# Patient Record
Sex: MALE | Race: WHITE | NOT HISPANIC OR LATINO | URBAN - METROPOLITAN AREA
[De-identification: names, ages, dates, MRNs, and addresses within clinical notes are randomized per-mention and may not be internally consistent; named-entity substitution may affect disease eponyms.]

---

## 2017-01-01 ENCOUNTER — INPATIENT (INPATIENT)
Facility: HOSPITAL | Age: 82
LOS: 14 days | Discharge: HOME | End: 2018-01-12
Attending: INTERNAL MEDICINE | Admitting: INTERNAL MEDICINE

## 2018-01-01 ENCOUNTER — INPATIENT (INPATIENT)
Facility: HOSPITAL | Age: 83
LOS: 10 days | Discharge: NURSING, REHAB OR LONG-TERM CARE | End: 2018-02-02
Attending: HOSPITALIST | Admitting: INTERNAL MEDICINE

## 2018-01-01 ENCOUNTER — OUTPATIENT (OUTPATIENT)
Dept: OUTPATIENT SERVICES | Facility: HOSPITAL | Age: 83
LOS: 1 days | Discharge: HOME | End: 2018-01-01

## 2018-01-01 ENCOUNTER — INPATIENT (INPATIENT)
Facility: HOSPITAL | Age: 83
LOS: 1 days | End: 2018-02-18
Attending: HOSPITALIST | Admitting: INTERNAL MEDICINE

## 2018-01-01 VITALS
HEART RATE: 45 BPM | RESPIRATION RATE: 18 BRPM | DIASTOLIC BLOOD PRESSURE: 45 MMHG | OXYGEN SATURATION: 98 % | TEMPERATURE: 96 F | SYSTOLIC BLOOD PRESSURE: 71 MMHG

## 2018-01-01 VITALS
DIASTOLIC BLOOD PRESSURE: 43 MMHG | RESPIRATION RATE: 20 BRPM | HEART RATE: 45 BPM | SYSTOLIC BLOOD PRESSURE: 66 MMHG | OXYGEN SATURATION: 100 %

## 2018-01-01 DIAGNOSIS — J18.9 PNEUMONIA, UNSPECIFIED ORGANISM: ICD-10-CM

## 2018-01-01 DIAGNOSIS — Z79.01 LONG TERM (CURRENT) USE OF ANTICOAGULANTS: ICD-10-CM

## 2018-01-01 DIAGNOSIS — F03.90 UNSPECIFIED DEMENTIA WITHOUT BEHAVIORAL DISTURBANCE: ICD-10-CM

## 2018-01-01 DIAGNOSIS — N17.9 ACUTE KIDNEY FAILURE, UNSPECIFIED: ICD-10-CM

## 2018-01-01 DIAGNOSIS — G25.81 RESTLESS LEGS SYNDROME: ICD-10-CM

## 2018-01-01 DIAGNOSIS — Z51.5 ENCOUNTER FOR PALLIATIVE CARE: ICD-10-CM

## 2018-01-01 DIAGNOSIS — G93.41 METABOLIC ENCEPHALOPATHY: ICD-10-CM

## 2018-01-01 DIAGNOSIS — I10 ESSENTIAL (PRIMARY) HYPERTENSION: ICD-10-CM

## 2018-01-01 DIAGNOSIS — D64.9 ANEMIA, UNSPECIFIED: ICD-10-CM

## 2018-01-01 DIAGNOSIS — I16.0 HYPERTENSIVE URGENCY: ICD-10-CM

## 2018-01-01 DIAGNOSIS — N18.4 CHRONIC KIDNEY DISEASE, STAGE 4 (SEVERE): ICD-10-CM

## 2018-01-01 DIAGNOSIS — E78.00 PURE HYPERCHOLESTEROLEMIA, UNSPECIFIED: ICD-10-CM

## 2018-01-01 DIAGNOSIS — R41.81 AGE-RELATED COGNITIVE DECLINE: ICD-10-CM

## 2018-01-01 DIAGNOSIS — F05 DELIRIUM DUE TO KNOWN PHYSIOLOGICAL CONDITION: ICD-10-CM

## 2018-01-01 DIAGNOSIS — J45.901 UNSPECIFIED ASTHMA WITH (ACUTE) EXACERBATION: ICD-10-CM

## 2018-01-01 DIAGNOSIS — E83.42 HYPOMAGNESEMIA: ICD-10-CM

## 2018-01-01 DIAGNOSIS — N41.2 ABSCESS OF PROSTATE: ICD-10-CM

## 2018-01-01 DIAGNOSIS — N18.9 CHRONIC KIDNEY DISEASE, UNSPECIFIED: ICD-10-CM

## 2018-01-01 DIAGNOSIS — Z00.00 ENCOUNTER FOR GENERAL ADULT MEDICAL EXAMINATION WITHOUT ABNORMAL FINDINGS: ICD-10-CM

## 2018-01-01 DIAGNOSIS — Z90.49 ACQUIRED ABSENCE OF OTHER SPECIFIED PARTS OF DIGESTIVE TRACT: ICD-10-CM

## 2018-01-01 DIAGNOSIS — K21.9 GASTRO-ESOPHAGEAL REFLUX DISEASE WITHOUT ESOPHAGITIS: ICD-10-CM

## 2018-01-01 DIAGNOSIS — R53.83 OTHER FATIGUE: ICD-10-CM

## 2018-01-01 DIAGNOSIS — I12.9 HYPERTENSIVE CHRONIC KIDNEY DISEASE WITH STAGE 1 THROUGH STAGE 4 CHRONIC KIDNEY DISEASE, OR UNSPECIFIED CHRONIC KIDNEY DISEASE: ICD-10-CM

## 2018-01-01 DIAGNOSIS — Z66 DO NOT RESUSCITATE: ICD-10-CM

## 2018-01-01 DIAGNOSIS — K59.00 CONSTIPATION, UNSPECIFIED: ICD-10-CM

## 2018-01-01 DIAGNOSIS — I48.91 UNSPECIFIED ATRIAL FIBRILLATION: ICD-10-CM

## 2018-01-01 DIAGNOSIS — R41.0 DISORIENTATION, UNSPECIFIED: ICD-10-CM

## 2018-01-01 DIAGNOSIS — Z98.890 OTHER SPECIFIED POSTPROCEDURAL STATES: Chronic | ICD-10-CM

## 2018-01-01 DIAGNOSIS — R26.9 UNSPECIFIED ABNORMALITIES OF GAIT AND MOBILITY: ICD-10-CM

## 2018-01-01 LAB
ALBUMIN SERPL ELPH-MCNC: 3 G/DL — SIGNIFICANT CHANGE UP (ref 3–5.5)
ALP SERPL-CCNC: 84 U/L — SIGNIFICANT CHANGE UP (ref 30–115)
ALT FLD-CCNC: 24 U/L — SIGNIFICANT CHANGE UP (ref 0–41)
AMMONIA BLD-MCNC: 22 MMOL/L — SIGNIFICANT CHANGE UP (ref 11–35)
ANION GAP SERPL CALC-SCNC: 12 MMOL/L — SIGNIFICANT CHANGE UP (ref 7–14)
APTT BLD: 30.5 SEC — SIGNIFICANT CHANGE UP (ref 27–39.2)
AST SERPL-CCNC: 28 U/L — SIGNIFICANT CHANGE UP (ref 0–41)
B-TYPE NATRIURETIC PEPTIDE BNP RESULT: 287 PG/ML — HIGH (ref 0–99)
BASE EXCESS BLDV CALC-SCNC: -4.3 MMOL/L — LOW (ref -2–2)
BASOPHILS # BLD AUTO: 0.01 K/UL — SIGNIFICANT CHANGE UP (ref 0–0.2)
BASOPHILS NFR BLD AUTO: 0.1 % — SIGNIFICANT CHANGE UP (ref 0–1)
BILIRUB SERPL-MCNC: 0.7 MG/DL — SIGNIFICANT CHANGE UP (ref 0.2–1.2)
BUN SERPL-MCNC: 81 MG/DL — CRITICAL HIGH (ref 10–20)
CA-I SERPL-SCNC: 1.29 MMOL/L — SIGNIFICANT CHANGE UP (ref 1.12–1.3)
CALCIUM SERPL-MCNC: 9.5 MG/DL — SIGNIFICANT CHANGE UP (ref 8.5–10.1)
CHLORIDE SERPL-SCNC: 102 MMOL/L — SIGNIFICANT CHANGE UP (ref 98–110)
CK MB CFR SERPL CALC: 5.8 NG/ML — SIGNIFICANT CHANGE UP (ref 0.6–6.3)
CO2 SERPL-SCNC: 22 MMOL/L — SIGNIFICANT CHANGE UP (ref 17–32)
CREAT SERPL-MCNC: 6.7 MG/DL — CRITICAL HIGH (ref 0.7–1.5)
EOSINOPHIL # BLD AUTO: 0.1 K/UL — SIGNIFICANT CHANGE UP (ref 0–0.7)
EOSINOPHIL NFR BLD AUTO: 1.1 % — SIGNIFICANT CHANGE UP (ref 0–8)
GAS PNL BLDV: 137 MMOL/L — SIGNIFICANT CHANGE UP (ref 136–145)
GAS PNL BLDV: SIGNIFICANT CHANGE UP
GLUCOSE SERPL-MCNC: 104 MG/DL — SIGNIFICANT CHANGE UP (ref 70–110)
HCO3 BLDV-SCNC: 22 MMOL/L — SIGNIFICANT CHANGE UP (ref 22–29)
HCT VFR BLD CALC: 29.9 % — LOW (ref 42–52)
HGB BLD-MCNC: 10.3 G/DL — LOW (ref 14–18)
IMM GRANULOCYTES NFR BLD AUTO: 0.5 % — HIGH (ref 0.1–0.3)
INR BLD: 1.04 RATIO — SIGNIFICANT CHANGE UP (ref 0.65–1.3)
LACTATE BLDV-MCNC: 1.3 MMOL/L — SIGNIFICANT CHANGE UP (ref 0.5–1.6)
LACTATE SERPL-SCNC: 1.6 MMOL/L — SIGNIFICANT CHANGE UP (ref 0.5–2.2)
LIDOCAIN IGE QN: <10 U/L — LOW (ref 7–60)
LYMPHOCYTES # BLD AUTO: 1.39 K/UL — SIGNIFICANT CHANGE UP (ref 1.2–3.4)
LYMPHOCYTES # BLD AUTO: 15.8 % — LOW (ref 20.5–51.1)
MAGNESIUM SERPL-MCNC: 2 MG/DL — SIGNIFICANT CHANGE UP (ref 1.8–2.4)
MCHC RBC-ENTMCNC: 32.1 PG — HIGH (ref 27–31)
MCHC RBC-ENTMCNC: 34.4 G/DL — SIGNIFICANT CHANGE UP (ref 32–37)
MCV RBC AUTO: 93.1 FL — SIGNIFICANT CHANGE UP (ref 80–94)
MONOCYTES # BLD AUTO: 0.83 K/UL — HIGH (ref 0.1–0.6)
MONOCYTES NFR BLD AUTO: 9.4 % — HIGH (ref 1.7–9.3)
NEUTROPHILS # BLD AUTO: 6.43 K/UL — SIGNIFICANT CHANGE UP (ref 1.4–6.5)
NEUTROPHILS NFR BLD AUTO: 73.1 % — SIGNIFICANT CHANGE UP (ref 42.2–75.2)
NRBC # BLD: 0 /100 WBCS — SIGNIFICANT CHANGE UP (ref 0–0)
PCO2 BLDV: 46 MMHG — SIGNIFICANT CHANGE UP (ref 41–51)
PH BLDV: 7.29 — SIGNIFICANT CHANGE UP (ref 7.26–7.43)
PLATELET # BLD AUTO: 146 K/UL — SIGNIFICANT CHANGE UP (ref 130–400)
PO2 BLDV: 28 MMHG — SIGNIFICANT CHANGE UP (ref 20–40)
POTASSIUM BLDV-SCNC: 3.9 MMOL/L — SIGNIFICANT CHANGE UP (ref 3.3–5.6)
POTASSIUM SERPL-MCNC: 3.6 MMOL/L — SIGNIFICANT CHANGE UP (ref 3.5–5)
POTASSIUM SERPL-SCNC: 3.6 MMOL/L — SIGNIFICANT CHANGE UP (ref 3.5–5)
PROT SERPL-MCNC: 5.7 G/DL — LOW (ref 6–8)
PROTHROM AB SERPL-ACNC: 11.2 SEC — SIGNIFICANT CHANGE UP (ref 9.95–12.87)
RBC # BLD: 3.21 M/UL — LOW (ref 4.7–6.1)
RBC # FLD: 15.5 % — HIGH (ref 11.5–14.5)
SAO2 % BLDV: 41 % — SIGNIFICANT CHANGE UP
SODIUM SERPL-SCNC: 136 MMOL/L — SIGNIFICANT CHANGE UP (ref 135–146)
TROPONIN I SERPL-MCNC: 0.03 NG/ML — SIGNIFICANT CHANGE UP (ref 0–0.05)
WBC # BLD: 8.8 K/UL — SIGNIFICANT CHANGE UP (ref 4.8–10.8)
WBC # FLD AUTO: 8.8 K/UL — SIGNIFICANT CHANGE UP (ref 4.8–10.8)

## 2018-01-01 RX ORDER — LEVOTHYROXINE SODIUM 125 MCG
200 TABLET ORAL AT BEDTIME
Qty: 0 | Refills: 0 | Status: DISCONTINUED | OUTPATIENT
Start: 2018-01-01 | End: 2018-01-01

## 2018-01-01 RX ORDER — SENNA PLUS 8.6 MG/1
1 TABLET ORAL
Qty: 0 | Refills: 0 | COMMUNITY

## 2018-01-01 RX ORDER — CALCITRIOL 0.5 UG/1
1 CAPSULE ORAL
Qty: 0 | Refills: 0 | COMMUNITY

## 2018-01-01 RX ORDER — ATORVASTATIN CALCIUM 80 MG/1
1 TABLET, FILM COATED ORAL
Qty: 0 | Refills: 0 | COMMUNITY

## 2018-01-01 RX ORDER — AZITHROMYCIN 500 MG/1
500 TABLET, FILM COATED ORAL ONCE
Qty: 0 | Refills: 0 | Status: COMPLETED | OUTPATIENT
Start: 2018-01-01 | End: 2018-01-01

## 2018-01-01 RX ORDER — DOCUSATE SODIUM 100 MG
1 CAPSULE ORAL
Qty: 0 | Refills: 0 | COMMUNITY

## 2018-01-01 RX ORDER — CEFEPIME 1 G/1
1000 INJECTION, POWDER, FOR SOLUTION INTRAMUSCULAR; INTRAVENOUS EVERY 12 HOURS
Qty: 0 | Refills: 0 | Status: DISCONTINUED | OUTPATIENT
Start: 2018-01-01 | End: 2018-01-01

## 2018-01-01 RX ORDER — MORPHINE SULFATE 50 MG/1
2 CAPSULE, EXTENDED RELEASE ORAL
Qty: 0 | Refills: 0 | Status: DISCONTINUED | OUTPATIENT
Start: 2018-01-01 | End: 2018-01-01

## 2018-01-01 RX ORDER — CLOPIDOGREL BISULFATE 75 MG/1
1 TABLET, FILM COATED ORAL
Qty: 0 | Refills: 0 | COMMUNITY

## 2018-01-01 RX ORDER — HALOPERIDOL DECANOATE 100 MG/ML
1 INJECTION INTRAMUSCULAR
Qty: 0 | Refills: 0 | COMMUNITY

## 2018-01-01 RX ORDER — ROPINIROLE 8 MG/1
0.25 TABLET, FILM COATED, EXTENDED RELEASE ORAL
Qty: 0 | Refills: 0 | COMMUNITY

## 2018-01-01 RX ORDER — LEVOTHYROXINE SODIUM 125 MCG
200 TABLET ORAL DAILY
Qty: 0 | Refills: 0 | Status: DISCONTINUED | OUTPATIENT
Start: 2018-01-01 | End: 2018-01-01

## 2018-01-01 RX ORDER — CEFEPIME 1 G/1
INJECTION, POWDER, FOR SOLUTION INTRAMUSCULAR; INTRAVENOUS
Qty: 0 | Refills: 0 | Status: DISCONTINUED | OUTPATIENT
Start: 2018-01-01 | End: 2018-01-01

## 2018-01-01 RX ORDER — AZITHROMYCIN 500 MG/1
TABLET, FILM COATED ORAL
Qty: 0 | Refills: 0 | Status: DISCONTINUED | OUTPATIENT
Start: 2018-01-01 | End: 2018-01-01

## 2018-01-01 RX ORDER — FAMOTIDINE 10 MG/ML
1 INJECTION INTRAVENOUS
Qty: 0 | Refills: 0 | COMMUNITY

## 2018-01-01 RX ORDER — ATENOLOL 25 MG/1
1 TABLET ORAL
Qty: 0 | Refills: 0 | COMMUNITY

## 2018-01-01 RX ORDER — NOREPINEPHRINE BITARTRATE/D5W 8 MG/250ML
0.5 PLASTIC BAG, INJECTION (ML) INTRAVENOUS
Qty: 8 | Refills: 0 | Status: DISCONTINUED | OUTPATIENT
Start: 2018-01-01 | End: 2018-01-01

## 2018-01-01 RX ORDER — INFLUENZA VIRUS VACCINE 15; 15; 15; 15 UG/.5ML; UG/.5ML; UG/.5ML; UG/.5ML
0.5 SUSPENSION INTRAMUSCULAR ONCE
Qty: 0 | Refills: 0 | Status: COMPLETED | OUTPATIENT
Start: 2018-01-01 | End: 2018-01-01

## 2018-01-01 RX ORDER — HALOPERIDOL DECANOATE 100 MG/ML
0.5 INJECTION INTRAMUSCULAR
Qty: 0 | Refills: 0 | COMMUNITY

## 2018-01-01 RX ORDER — DOPAMINE HYDROCHLORIDE 40 MG/ML
4 INJECTION, SOLUTION, CONCENTRATE INTRAVENOUS
Qty: 400 | Refills: 0 | Status: DISCONTINUED | OUTPATIENT
Start: 2018-01-01 | End: 2018-01-01

## 2018-01-01 RX ORDER — SCOPALAMINE 1 MG/3D
1 PATCH, EXTENDED RELEASE TRANSDERMAL ONCE
Qty: 0 | Refills: 0 | Status: COMPLETED | OUTPATIENT
Start: 2018-01-01 | End: 2018-01-01

## 2018-01-01 RX ORDER — HYDROCORTISONE 20 MG
100 TABLET ORAL ONCE
Qty: 0 | Refills: 0 | Status: COMPLETED | OUTPATIENT
Start: 2018-01-01 | End: 2018-01-01

## 2018-01-01 RX ORDER — CEFEPIME 1 G/1
1000 INJECTION, POWDER, FOR SOLUTION INTRAMUSCULAR; INTRAVENOUS ONCE
Qty: 0 | Refills: 0 | Status: COMPLETED | OUTPATIENT
Start: 2018-01-01 | End: 2018-01-01

## 2018-01-01 RX ORDER — SODIUM CHLORIDE 9 MG/ML
1000 INJECTION, SOLUTION INTRAVENOUS ONCE
Qty: 0 | Refills: 0 | Status: COMPLETED | OUTPATIENT
Start: 2018-01-01 | End: 2018-01-01

## 2018-01-01 RX ORDER — SODIUM CHLORIDE 9 MG/ML
1000 INJECTION INTRAMUSCULAR; INTRAVENOUS; SUBCUTANEOUS ONCE
Qty: 0 | Refills: 0 | Status: COMPLETED | OUTPATIENT
Start: 2018-01-01 | End: 2018-01-01

## 2018-01-01 RX ADMIN — MORPHINE SULFATE 2 MILLIGRAM(S): 50 CAPSULE, EXTENDED RELEASE ORAL at 07:48

## 2018-01-01 RX ADMIN — MORPHINE SULFATE 2 MILLIGRAM(S): 50 CAPSULE, EXTENDED RELEASE ORAL at 17:50

## 2018-01-01 RX ADMIN — Medication 1 MILLIGRAM(S): at 20:05

## 2018-01-01 RX ADMIN — MORPHINE SULFATE 2 MILLIGRAM(S): 50 CAPSULE, EXTENDED RELEASE ORAL at 10:09

## 2018-01-01 RX ADMIN — Medication 1 MILLIGRAM(S): at 16:30

## 2018-01-01 RX ADMIN — MORPHINE SULFATE 2 MILLIGRAM(S): 50 CAPSULE, EXTENDED RELEASE ORAL at 07:12

## 2018-01-01 RX ADMIN — MORPHINE SULFATE 2 MILLIGRAM(S): 50 CAPSULE, EXTENDED RELEASE ORAL at 19:20

## 2018-01-01 RX ADMIN — MORPHINE SULFATE 2 MILLIGRAM(S): 50 CAPSULE, EXTENDED RELEASE ORAL at 02:34

## 2018-01-01 RX ADMIN — Medication 65.62 MICROGRAM(S)/KG/MIN: at 13:56

## 2018-01-01 RX ADMIN — MORPHINE SULFATE 2 MILLIGRAM(S): 50 CAPSULE, EXTENDED RELEASE ORAL at 13:22

## 2018-01-01 RX ADMIN — MORPHINE SULFATE 2 MILLIGRAM(S): 50 CAPSULE, EXTENDED RELEASE ORAL at 17:55

## 2018-01-01 RX ADMIN — MORPHINE SULFATE 2 MILLIGRAM(S): 50 CAPSULE, EXTENDED RELEASE ORAL at 03:25

## 2018-01-01 RX ADMIN — SODIUM CHLORIDE 1000 MILLILITER(S): 9 INJECTION, SOLUTION INTRAVENOUS at 09:43

## 2018-01-01 RX ADMIN — MORPHINE SULFATE 2 MILLIGRAM(S): 50 CAPSULE, EXTENDED RELEASE ORAL at 10:10

## 2018-01-01 RX ADMIN — CEFEPIME 100 MILLIGRAM(S): 1 INJECTION, POWDER, FOR SOLUTION INTRAMUSCULAR; INTRAVENOUS at 09:54

## 2018-01-01 RX ADMIN — AZITHROMYCIN 255 MILLIGRAM(S): 500 TABLET, FILM COATED ORAL at 10:29

## 2018-01-01 RX ADMIN — Medication 1 MILLIGRAM(S): at 01:52

## 2018-01-01 RX ADMIN — Medication 200 MICROGRAM(S): at 11:38

## 2018-01-01 RX ADMIN — MORPHINE SULFATE 2 MILLIGRAM(S): 50 CAPSULE, EXTENDED RELEASE ORAL at 17:54

## 2018-01-01 RX ADMIN — DOPAMINE HYDROCHLORIDE 10.5 MICROGRAM(S)/KG/MIN: 40 INJECTION, SOLUTION, CONCENTRATE INTRAVENOUS at 11:38

## 2018-01-01 RX ADMIN — MORPHINE SULFATE 2 MILLIGRAM(S): 50 CAPSULE, EXTENDED RELEASE ORAL at 20:05

## 2018-01-01 RX ADMIN — MORPHINE SULFATE 2 MILLIGRAM(S): 50 CAPSULE, EXTENDED RELEASE ORAL at 23:12

## 2018-01-01 RX ADMIN — MORPHINE SULFATE 2 MILLIGRAM(S): 50 CAPSULE, EXTENDED RELEASE ORAL at 06:01

## 2018-01-01 RX ADMIN — Medication 1 MILLIGRAM(S): at 14:42

## 2018-01-01 RX ADMIN — MORPHINE SULFATE 2 MILLIGRAM(S): 50 CAPSULE, EXTENDED RELEASE ORAL at 13:18

## 2018-01-01 RX ADMIN — SODIUM CHLORIDE 1000 MILLILITER(S): 9 INJECTION INTRAMUSCULAR; INTRAVENOUS; SUBCUTANEOUS at 09:02

## 2018-01-01 RX ADMIN — MORPHINE SULFATE 2 MILLIGRAM(S): 50 CAPSULE, EXTENDED RELEASE ORAL at 08:03

## 2018-01-01 RX ADMIN — SCOPALAMINE 1 PATCH: 1 PATCH, EXTENDED RELEASE TRANSDERMAL at 19:28

## 2018-01-01 RX ADMIN — MORPHINE SULFATE 2 MILLIGRAM(S): 50 CAPSULE, EXTENDED RELEASE ORAL at 20:20

## 2018-01-01 RX ADMIN — MORPHINE SULFATE 2 MILLIGRAM(S): 50 CAPSULE, EXTENDED RELEASE ORAL at 16:15

## 2018-02-16 NOTE — ED ADULT NURSE NOTE - CHIEF COMPLAINT QUOTE
Pt BIBA from [facility] for elevated BUN/creatinine. Pt also hypotensive in triage.

## 2018-02-16 NOTE — CONSULT NOTE ADULT - PROBLEM SELECTOR RECOMMENDATION 9
Introduced Palliative Care Team Concept - family had already been educated about Advance Illness philosophy.  Comfort care only - no escalation of care. Introduced Palliative Care Team Concept - family had already been educated about Advance Illness philosophy.  Comfort care only - no escalation of care, no further blood draws/fingersticks, no artificial hydration/nutirtion.

## 2018-02-16 NOTE — ED ADULT NURSE NOTE - PMH
Altered mental status    Atrial fibrillation    Chronic kidney disease    GERD (gastroesophageal reflux disease)    High cholesterol    Hypertension

## 2018-02-16 NOTE — CONSULT NOTE ADULT - ASSESSMENT
89 year old being transitioned to comfort care only; 4 sons at bedside all in agreement. Questions answered.

## 2018-02-16 NOTE — ED PROVIDER NOTE - NS ED ROS FT
Eyes:  No visual changes, eye pain or discharge.  ENMT:  No hearing changes, pain, discharge or infections.   Cardiac:  No chest pain, SOB or edema.   Respiratory:  No cough or respiratory distress.   GI:  No nausea, vomiting, diarrhea or abdominal pain.  MS: Pain over buttocks and over R hip. No myalgia, muscle weakness, joint pain  Neuro:  No headache or weakness.  No LOC.  Skin:  No skin rash.

## 2018-02-16 NOTE — CONSULT NOTE ADULT - SUBJECTIVE AND OBJECTIVE BOX
REQUESTED OF: DR Moreno    CLINICAL ISSUE TO BE EVALUATED BY CONSULTANT: Transition to Advance Illness - No escalation of care; comfort measures only    MOISÉS MACKENZIE 89yMale "Bill"  HPI: 89 yo M w/ CKD, Dementia, HTN, DLD, afib sent from [facility] for worsening kidney function.  Pt. unable to provide any history due to mental status. Discussed case with sons who are HCP's and agree for comfort measures only. (16 Feb 2018 15:34)    PAST MEDICAL & SURGICAL HISTORY:  Altered mental status  Chronic kidney disease  GERD (gastroesophageal reflux disease)  High cholesterol  Hypertension  Atrial fibrillation  H/O prostatectomy  History of cholecystectomy      PHYSICAL EXAM: Alert and becoming agitated; noncommunative    Constitutional:    Respiratory: Slightly labored with intercostal muscle use; RR 24-26    Extremities: no edema; no mottling; warm    T(C): 33.9, Max: 33.9 (12:11)  HR: 69 (45 - 73)  BP: 94/51 (66/43 - 105/60) --- levo now off.  RR: 20 (18 - 22)  SpO2: 100% (97% - 100%)    LABS/STUDIES:  02-16    136  |  102  |  81<HH>  ----------------------------<  104  3.6   |  22  |  6.7<HH>    Ca    9.5      16 Feb 2018 08:47  Mg     2.0     02-16    TPro  5.7<L>  /  Alb  3.0  /  TBili  0.7  /  DBili  x   /  AST  28  /  ALT  24  /  AlkPhos  84  02-16                        10.3   8.80  )-----------( 146      ( 16 Feb 2018 08:47 )             29.9     MEDICATIONS  (STANDING):    MEDICATIONS  (PRN):  morphine  - Injectable 2 milliGRAM(s) IV Push every 1 hour PRN dyspnea, agitation, pain        PPS (Palliative Performance Scale): 10% REQUESTED OF: DR Moreno    CLINICAL ISSUE TO BE EVALUATED BY CONSULTANT: Transition to Advance Illness - No escalation of care; comfort measures only    MOISÉS MACKENZIE 89yMale "Bill"  HPI: 89 yo M w/ CKD, Dementia, HTN, DLD, afib sent from [facility] for worsening kidney function.  Pt. unable to provide any history due to mental status. Discussed case with sons who are HCP's and agree for comfort measures only. (16 Feb 2018 15:34)    Patient seen in ED. Minimally responsive, restless. Reported in mild resp distress earlier, alleviated w Morphine 2mg IVP.  No N/V.     PAST MEDICAL & SURGICAL HISTORY:  Altered mental status  Chronic kidney disease  GERD (gastroesophageal reflux disease)  High cholesterol  Hypertension  Atrial fibrillation  H/O prostatectomy  History of cholecystectomy      PHYSICAL EXAM: Alert and becoming agitated; noncommunative    Constitutional:    Respiratory: Slightly labored with intercostal muscle use; RR 24-26    Extremities: no edema; no mottling; warm    T(C): 33.9, Max: 33.9 (12:11)  HR: 69 (45 - 73)  BP: 94/51 (66/43 - 105/60) --- levo now off.  RR: 20 (18 - 22)  SpO2: 100% (97% - 100%)    LABS/STUDIES:  02-16    136  |  102  |  81<HH>  ----------------------------<  104  3.6   |  22  |  6.7<HH>    Ca    9.5      16 Feb 2018 08:47  Mg     2.0     02-16    TPro  5.7<L>  /  Alb  3.0  /  TBili  0.7  /  DBili  x   /  AST  28  /  ALT  24  /  AlkPhos  84  02-16                        10.3   8.80  )-----------( 146      ( 16 Feb 2018 08:47 )             29.9     MEDICATIONS  (STANDING):    MEDICATIONS  (PRN):  morphine  - Injectable 2 milliGRAM(s) IV Push every 1 hour PRN dyspnea, agitation, pain        PPS (Palliative Performance Scale): 10%

## 2018-02-16 NOTE — ED PROVIDER NOTE - CRITICAL CARE PROVIDED
documentation/consult w/ pt's family directly relating to pts condition/additional history taking/conducted a detailed discussion of DNR status/interpretation of diagnostic studies

## 2018-02-16 NOTE — H&P ADULT - NSHPPHYSICALEXAM_GEN_ALL_CORE
PHYSICAL EXAM:  GENERAL: NAD, well-developed  HEAD:  Atraumatic, Normocephalic  EYES: EOMI, PERRLA, conjunctiva and sclera clear  NECK: Supple, No JVD  CHEST/LUNG: Clear to auscultation bilaterally; No wheeze  HEART: Regular rate and rhythm; No murmurs, rubs, or gallops  ABDOMEN: Soft, Nontender, Nondistended; Bowel sounds present  EXTREMITIES:  2+ Peripheral Pulses, No clubbing, cyanosis, or edema  PSYCH: AAOx0  NEUROLOGY: non-focal  SKIN: No rashes or lesions

## 2018-02-16 NOTE — CONSULT NOTE ADULT - ATTENDING COMMENTS
Pt seen, met with family. Plan as above. Family is aware of patient's grave prognosis. Levo gtt discontinued.  If pt remains stable, may consider Hospice care.  In interim, continue comfort measures only.

## 2018-02-16 NOTE — H&P ADULT - ASSESSMENT
89 yo M CKD, HTN, DLD, Dementia sent from [facility] for worsening renal function.    1. Comfort measures only - pt. family does not want any invasive intervention including DNR/DNI, blood draws, artificial nutrition, pressors, antibiotics.  will admit to AI unit.  Spoke with Dr. Martines 89 yo M CKD, HTN, DLD, Dementia sent from [facility] for worsening renal function.    1. Comfort measures only - pt. family does not want any invasive intervention including DNR/DNI, blood draws, artificial nutrition, pressors, antibiotics.  will admit to AI unit.  Spoke with Dr. 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 Patient unreliable historian/collateral history provided by sons at bedside. Known recent functional decline, admitted for comfort oriented measures/analgesia/ AI bed will palliative care      REVIEW OF SYSTEMS:  pain/agitation controlled with current analgesia/anxiolytics   All other review of systems negative    T(C): 35.8 (02-17-18 @ 08:44), Max: 35.8 (02-17-18 @ 08:44)  HR: 45 (02-17-18 @ 08:44) (45 - 65)  BP: 71/45 (02-17-18 @ 08:44) (71/45 - 98/50)  RR: 18 (02-17-18 @ 08:44) (18 - 18)  SpO2: 98% (02-17-18 @ 08:44) (98% - 98%)  Wt(kg): --Vital Signs Last 24 Hrs  T(C): 35.8 (17 Feb 2018 08:44), Max: 35.8 (17 Feb 2018 08:44)  T(F): 96.5 (17 Feb 2018 08:44), Max: 96.5 (17 Feb 2018 08:44)  HR: 45 (17 Feb 2018 08:44) (45 - 65)  BP: 71/45 (17 Feb 2018 08:44) (71/45 - 98/50)  BP(mean): --  RR: 18 (17 Feb 2018 08:44) (18 - 18)  SpO2: 98% (17 Feb 2018 08:44) (98% - 98%)      02-17-18 @ 07:01  -  02-17-18 @ 16:53  --------------------------------------------------------  IN: 0 mL / OUT: 1 mL / NET: -1 mL        PHYSICAL EXAM:  GENERAL: NAD comfortable, elderly  PSYCH: no agitation, confused  NERVOUS SYSTEM: CN's grossly intact  PULMONARY: defers  CARDIOVASCULAR: defers  GI: Soft, Nontender, Nondistended; Bowel sounds present  EXTREMITIES:  defers  SKIN: No rashes or lesions    Consultant(s) Notes Reviewed:  [x ] YES  [ ] NO    Discussed with Consultants/Other Providers [ x] YES     LABS                          10.3   8.80  )-----------( 146      ( 16 Feb 2018 08:47 )             29.9     02-16    136  |  102  |  81<HH>  ----------------------------<  104  3.6   |  22  |  6.7<HH>    Ca    9.5      16 Feb 2018 08:47  Mg     2.0     02-16    TPro  5.7<L>  /  Alb  3.0  /  TBili  0.7  /  DBili  x   /  AST  28  /  ALT  24  /  AlkPhos  84  02-16        PT/INR - ( 16 Feb 2018 08:47 )   PT: 11.20 sec;   INR: 1.04 ratio         PTT - ( 16 Feb 2018 08:47 )  PTT:30.5 sec  Lactate Trend  02-16 @ 08:47 Lactate:1.6     CARDIAC MARKERS ( 16 Feb 2018 08:47 )  0.03 ng/mL / x     / x     / x     / 5.8 ng/mL      CAPILLARY BLOOD GLUCOSE            RADIOLOGY & ADDITIONAL TESTS:    Imaging Personally Reviewed:  [ ] YES  [ ] NO    HEALTH ISSUES - PROBLEM Dx:  End of life care: End of life care  Palliative care encounter: Palliative care encounter      89 yo M CKD, HTN, DLD, Dementia here with acute renal failure, pain, metabolic encephalopathy/ delirium  comfort oriented care/ opiate/ benzo/ supportive care, family at bedside  counselling/support provided  DNR/DNI  High risk

## 2018-02-16 NOTE — H&P ADULT - NSHPLABSRESULTS_GEN_ALL_CORE
10.3   8.80  )-----------( 146      ( 16 Feb 2018 08:47 )             29.9     02-16    136  |  102  |  81<HH>  ----------------------------<  104  3.6   |  22  |  6.7<HH>    Ca    9.5      16 Feb 2018 08:47  Mg     2.0     02-16    TPro  5.7<L>  /  Alb  3.0  /  TBili  0.7  /  DBili  x   /  AST  28  /  ALT  24  /  AlkPhos  84  02-16        PT/INR - ( 16 Feb 2018 08:47 )   PT: 11.20 sec;   INR: 1.04 ratio       PTT - ( 16 Feb 2018 08:47 )  PTT:30.5 sec    Lactate Trend  02-16 @ 08:47 Lactate:1.6     CARDIAC MARKERS ( 16 Feb 2018 08:47 )  0.03 ng/mL / x     / x     / x     / 5.8 ng/mL

## 2018-02-16 NOTE — ED ADULT TRIAGE NOTE - CHIEF COMPLAINT QUOTE
Pt BIBA from [place] for elevated BUN/creatinine. Pt BIBA from [facility] for elevated BUN/creatinine. Pt also hypotensive in triage.

## 2018-02-16 NOTE — ED PROVIDER NOTE - OBJECTIVE STATEMENT
89 y m pmh dementia, afib, htn, hld, gerd, ckd BIBEMS from [facility] for elevated BUN/Cr. BUN/Cr of 39/2.7 on 2/3/18 and today was noted to be 77/6.04. Pt complaining of pain over his buttocks. Denies cp, sob, dizziness, lh, abdominal pain. 89 y m pmh dementia, afib, htn, hld, gerd, ckd BIBEMS from [facility] for elevated BUN/Cr. BUN/Cr of 39/2.7 on 2/3/18 and today was noted to be 77/6.04. Pt complaining of pain over his buttocks. Denies cp, sob, dizziness, lh, abdominal pain.

## 2018-02-16 NOTE — ED PROVIDER NOTE - PROGRESS NOTE DETAILS
ATTENDING NOTE: 89 y/o male with an pmhx of A-fib on Atenolol, CAD on Plavix, Nursing Home resident presents with outpatient labs elevated creatinine. Upon arriving to the ED pt was hypotensive and bradycardic in the 40’s-60’s A-fib. No recent fevers, chills, change in behavior. PE: elderly male, responds to loud voice which is normal per family/friends at bedside, heart irregular regular slow, b/l LE trace pitting edema, b/l bruising over iliac crest, right buttock and right greater trochanter, no skin break, minimal TTP, normal ROM. A/P: concern for beta blocker overdosing, renal failure, same dose of Atenolol vs. hypothyroid, new onset given low temp, and brachycardic and hypotensive vs. new onset renal failure vs. sepsis. Labs, imaging, fluids, abx, initiate Synthroid therapy associated for improve BP, if BP drops again treatment for beta blocker overdose and admit. Pt is also DNR.

## 2018-02-16 NOTE — H&P ADULT - HISTORY OF PRESENT ILLNESS
89 yo M w/ CKD, Dementia, HTN, DLD, afib sent from [facility] for worsening kidney function.  Pt. unable to provide any history due to mental status. Discussed case with sons who are HCP's and agree for comfort measures only.

## 2018-02-16 NOTE — ED PROVIDER NOTE - MEDICAL DECISION MAKING DETAILS
Persisent concern for Sepsis with PNA vs hypothyroidism vs isolated hypothermia vs effects of renal failure vs buildup of Atenolol in context of CHARLEEN as cause for hypotension and bradycardia in context of xr findings and renal failure. Patient is DNR, DNI, wishes for no dialysis. On Dopamine drip stable and comfortable. Will admit.

## 2018-02-16 NOTE — ED ADULT NURSE NOTE - OBJECTIVE STATEMENT
Pt was brought in from [facility] for worsening kidney function, bradycardia and hypotension.

## 2018-02-16 NOTE — H&P ADULT - ATTENDING COMMENTS
Patient seen and examined independently of resident. Agree with above note with exceptions described in a/p addendum. Case discussed with housestaff, nursing and patient's sons

## 2018-02-16 NOTE — ED ADULT NURSE REASSESSMENT NOTE - NS ED NURSE REASSESS COMMENT FT1
Tito chandra applied for low temp
Pt switched from dopamine to levophed at MD request, will see if patient tolerates.

## 2018-02-16 NOTE — CONSULT NOTE ADULT - PROBLEM SELECTOR RECOMMENDATION 2
Morphine 2mg IV q 1H PRN for restlessness/respiratory distress. Morphine 2mg IV q 1H PRN for restlessness/respiratory distress.  Ativan 1mg Iv Q6h prn terminal restlessness

## 2018-02-16 NOTE — ED PROVIDER NOTE - PHYSICAL EXAMINATION
CONSTITUTIONAL: Well-developed; well-nourished; in no acute distress.   SKIN: warm, dry. Ecchymoses over buttocks and hips. Scabs over shins bilaterally.  HEAD: Normocephalic; atraumatic.  EYES: PERRL, EOMI, normal sclera and conjunctiva   ENT: No nasal discharge; airway clear. Dry mucous membranes.   NECK: Supple; non tender.  CARD: S1, S2 normal; no murmurs, gallops, or rubs. Regular rate. Abnormal rhythm.   RESP: No wheezes, rales or rhonchi.  ABD: soft ntnd. Bowel sounds normal  EXT: Normal ROM.  Mild pitting edema over the feet.   LYMPH: No acute cervical adenopathy.  NEURO: Awake, baseline dementia, but responding to questions appropriately and following directions. No cranial nerve deficits, moving all extremities well.

## 2018-02-18 NOTE — DISCHARGE NOTE FOR THE EXPIRED PATIENT - OTHER SIGNIFICANT FINDINGS
Patient is a 89y old  with the past medical history of  CKD, HTN, DLD, Dementia was sent by [facility] for renal failure, hypothermia & hypotension.  As per family patient request and palliative team patient was DNR/DNI and placed on comfort oriented care/ opiate/ benzo/ supportive care.

## 2018-02-18 NOTE — PROGRESS NOTE ADULT - ATTENDING COMMENTS
Patient seen and examined independently of resident. Case discussed with housestaff, nursing and patient's family at bedside

## 2018-02-18 NOTE — PROGRESS NOTE ADULT - ASSESSMENT
1. Comfort measures only - pt. family does not want any invasive intervention including DNR/DNI, blood draws, artificial nutrition, pressors, antibiotics.  will admit to AI unit.  Spoke with family this AM to confirm goals of care.
89 yo M CKD, HTN, DLD, Dementia here with acute renal failure, pain, metabolic encephalopathy/ delirium    comfort oriented care/ opiate/ benzo/ supportive care, family at bedside  counselling/support provided  DNR/DNI  High risk  no change in current management/ symptom control achieved  if upper airway secretions increase, add scopolamine patch

## 2018-02-18 NOTE — DISCHARGE NOTE FOR THE EXPIRED PATIENT - HOSPITAL COURSE
89 yo M w/ CKD, Dementia, HTN, DLD, afib sent from [facility] for worsening kidney function.  In ED, patient was Minimally responsive, restless. Reported in mild resp distress earlier, alleviated w Morphine 2mg IVP.Pt. unable to provide any history due to mental status. Discussed case with sons who are HCP's and agree for comfort measures only. Palliative team was also involved and in agreement with comfort oriented care. Patient was transferred to Clinical effective unit and was pronounced at 23:40 on 02/18/18. He was not a candidate for organ donation: Ref Number: [id number]

## 2018-02-18 NOTE — PROGRESS NOTE ADULT - SUBJECTIVE AND OBJECTIVE BOX
SUBJECTIVE:    Patient is a 89y old Male who presents with a chief complaint of sent by [facility] for renal failure, hypothermia & hypotension (16 Feb 2018 18:52)    Currently admitted to medicine with the primary diagnosis of CHARLEEN (acute kidney injury)     Today is hospital day 1d. This morning he is resting comfortably in bed and reports no new issues or overnight events.     PAST MEDICAL & SURGICAL HISTORY  Altered mental status  Chronic kidney disease  GERD (gastroesophageal reflux disease)  High cholesterol  Hypertension  Atrial fibrillation  H/O prostatectomy  History of cholecystectomy    SOCIAL HISTORY:  Negative for smoking/alcohol/drug use.     ALLERGIES:  No Known Allergies    MEDICATIONS:  STANDING MEDICATIONS  influenza   Vaccine 0.5 milliLiter(s) IntraMuscular once    PRN MEDICATIONS  LORazepam   Injectable 1 milliGRAM(s) IV Push every 6 hours PRN  morphine  - Injectable 2 milliGRAM(s) IV Push every 1 hour PRN    VITALS:   T(F): 96  HR: 60  BP: 91/55  RR: 18  SpO2: 100%    LABS:                        10.3   8.80  )-----------( 146      ( 16 Feb 2018 08:47 )             29.9     02-16    136  |  102  |  81<HH>  ----------------------------<  104  3.6   |  22  |  6.7<HH>    Ca    9.5      16 Feb 2018 08:47  Mg     2.0     02-16    TPro  5.7<L>  /  Alb  3.0  /  TBili  0.7  /  DBili  x   /  AST  28  /  ALT  24  /  AlkPhos  84  02-16    PT/INR - ( 16 Feb 2018 08:47 )   PT: 11.20 sec;   INR: 1.04 ratio         PTT - ( 16 Feb 2018 08:47 )  PTT:30.5 sec      Troponin I, Serum: 0.03 ng/mL (02-16-18 @ 08:47)  Lactate, Blood: 1.6 mmol/L (02-16-18 @ 08:47)      CARDIAC MARKERS ( 16 Feb 2018 08:47 )  0.03 ng/mL / x     / x     / x     / 5.8 ng/mL      RADIOLOGY:    CXR: Persistent left lower lobe opacity.  XR Hip R: Osteopenic bones. Mild degenerative changes of the hip joint. No acute osseous abnormalities.    PHYSICAL EXAM:  GEN: No acute distress; sleeping comfortably in bed  LUNGS: Clear to auscultation bilaterally   HEART: S1/S2 present. RRR.   ABD: Soft, non-tender, non-distended. Bowel sounds present  EXT: NC/NC/NE/2+PP/FOLEY  NEURO: AAOX3
AVRILMOISÉS HERNANDEZ  89y  Male      Patient is a 89y old  Male who presents with a chief complaint of sent by [facility] for renal failure, hypothermia & hypotension (16 Feb 2018 18:52)      INTERVAL HPI/OVERNIGHT EVENTS: comfortable, not verbal at the moment      REVIEW OF SYSTEMS:  family at bedside reports some gurgling earlier now resolved, earlier period of agitation resolved with benzo  All other review of systems negative    T(C): --  HR: --  BP: --  RR: --  SpO2: --  Wt(kg): --Vital Signs Last 24 Hrs  T(C): --  T(F): --  HR: --  BP: --  BP(mean): --  RR: --  SpO2: --      02-17-18 @ 07:01  -  02-18-18 @ 07:00  --------------------------------------------------------  IN: 0 mL / OUT: 1 mL / NET: -1 mL        PHYSICAL EXAM:  GENERAL: NAD, resting peacefully  PSYCH: no agitation, sleeping  NERVOUS SYSTEM:  grossly no new focal deficits noted  PULMONARY: coarse BS on R, mild upper airway secretions, but minimal  CARDIOVASCULAR: Regular rate and rhythm; No murmurs, rubs, or gallops  GI: Soft, Nontender, Nondistended; Bowel sounds present  EXTREMITIES:  2+ Peripheral Pulses, No clubbing, cyanosis, or edema    Consultant(s) Notes Reviewed:  [x ] YES  [ ] NO    Discussed with Consultants/Other Providers [ x] YES     LABS  none  RADIOLOGY & ADDITIONAL TESTS:  none  Imaging Personally Reviewed:  [ ] YES  [x ] NO    HEALTH ISSUES - PROBLEM Dx:  Metabolic encephalopathy: Metabolic encephalopathy  CHARLEEN (acute kidney injury): CHARLEEN (acute kidney injury)  End of life care: End of life care  Palliative care encounter: Palliative care encounter

## 2018-02-20 DIAGNOSIS — I25.10 ATHEROSCLEROTIC HEART DISEASE OF NATIVE CORONARY ARTERY WITHOUT ANGINA PECTORIS: ICD-10-CM

## 2018-02-20 DIAGNOSIS — Z51.5 ENCOUNTER FOR PALLIATIVE CARE: ICD-10-CM

## 2018-02-20 DIAGNOSIS — I12.9 HYPERTENSIVE CHRONIC KIDNEY DISEASE WITH STAGE 1 THROUGH STAGE 4 CHRONIC KIDNEY DISEASE, OR UNSPECIFIED CHRONIC KIDNEY DISEASE: ICD-10-CM

## 2018-02-20 DIAGNOSIS — N18.9 CHRONIC KIDNEY DISEASE, UNSPECIFIED: ICD-10-CM

## 2018-02-20 DIAGNOSIS — Z66 DO NOT RESUSCITATE: ICD-10-CM

## 2018-02-20 DIAGNOSIS — F03.90 UNSPECIFIED DEMENTIA WITHOUT BEHAVIORAL DISTURBANCE: ICD-10-CM

## 2018-02-20 DIAGNOSIS — L89.151 PRESSURE ULCER OF SACRAL REGION, STAGE 1: ICD-10-CM

## 2018-02-20 DIAGNOSIS — N17.9 ACUTE KIDNEY FAILURE, UNSPECIFIED: ICD-10-CM

## 2018-02-20 DIAGNOSIS — K21.9 GASTRO-ESOPHAGEAL REFLUX DISEASE WITHOUT ESOPHAGITIS: ICD-10-CM

## 2018-02-20 DIAGNOSIS — E78.5 HYPERLIPIDEMIA, UNSPECIFIED: ICD-10-CM

## 2018-02-20 DIAGNOSIS — I48.91 UNSPECIFIED ATRIAL FIBRILLATION: ICD-10-CM

## 2018-02-21 LAB
CULTURE RESULTS: SIGNIFICANT CHANGE UP
CULTURE RESULTS: SIGNIFICANT CHANGE UP
SPECIMEN SOURCE: SIGNIFICANT CHANGE UP
SPECIMEN SOURCE: SIGNIFICANT CHANGE UP

## 2018-02-23 DIAGNOSIS — G93.41 METABOLIC ENCEPHALOPATHY: ICD-10-CM

## 2018-02-23 DIAGNOSIS — Z79.01 LONG TERM (CURRENT) USE OF ANTICOAGULANTS: ICD-10-CM

## 2018-02-27 DIAGNOSIS — R10.9 UNSPECIFIED ABDOMINAL PAIN: ICD-10-CM
